# Patient Record
Sex: FEMALE | NOT HISPANIC OR LATINO | Employment: UNEMPLOYED | URBAN - METROPOLITAN AREA
[De-identification: names, ages, dates, MRNs, and addresses within clinical notes are randomized per-mention and may not be internally consistent; named-entity substitution may affect disease eponyms.]

---

## 2017-01-17 ENCOUNTER — OFFICE VISIT (OUTPATIENT)
Dept: OBSTETRICS AND GYNECOLOGY | Facility: CLINIC | Age: 37
End: 2017-01-17
Payer: COMMERCIAL

## 2017-01-17 VITALS
SYSTOLIC BLOOD PRESSURE: 100 MMHG | HEART RATE: 71 BPM | DIASTOLIC BLOOD PRESSURE: 58 MMHG | WEIGHT: 158.31 LBS | BODY MASS INDEX: 28.05 KG/M2 | HEIGHT: 63 IN

## 2017-01-17 DIAGNOSIS — Z30.09 ENCOUNTER FOR FEMALE FAMILY PLANNING COUNSELING: ICD-10-CM

## 2017-01-17 DIAGNOSIS — N92.6 IRREGULAR MENSES: Primary | ICD-10-CM

## 2017-01-17 PROCEDURE — 99202 OFFICE O/P NEW SF 15 MIN: CPT | Mod: S$GLB,,, | Performed by: NURSE PRACTITIONER

## 2017-01-17 PROCEDURE — 1159F MED LIST DOCD IN RCRD: CPT | Mod: S$GLB,,, | Performed by: NURSE PRACTITIONER

## 2017-01-17 PROCEDURE — 99999 PR PBB SHADOW E&M-NEW PATIENT-LVL III: CPT | Mod: PBBFAC,,, | Performed by: NURSE PRACTITIONER

## 2017-01-17 NOTE — PROGRESS NOTES
Chief Complaint   Patient presents with    Well Woman       History of Present Illness: Julisa Deleon is a 36 y.o. female that presents today 2017 for   Pt here for irregular menses. She brought log of cycles in to show the dates (listed below).      Pt reports that she feels like they have gotten closer together. She states that the cycles are about 5 days in length and are not heavy. She has been worrying about her cycles because she has been trying to get pregnant for the past 2-3 months, so here cycles and ovulation has been on her mind more. She has a 12 year old, but just moved here from [place] and wants to settle down and have a baby. No other complaints noted.       Chief Complaint   Patient presents with    Well Woman         Past Medical History   Diagnosis Date    Anemia        Past Surgical History   Procedure Laterality Date    Appendectomy       section         No current outpatient prescriptions on file.     No current facility-administered medications for this visit.        Review of patient's allergies indicates:  No Known Allergies    Family History   Problem Relation Age of Onset    Lung cancer Maternal Grandmother     Breast cancer Neg Hx     Cancer Neg Hx     Colon cancer Neg Hx     Ovarian cancer Neg Hx     Diabetes Neg Hx     Eclampsia Neg Hx     Hypertension Neg Hx     Miscarriages / Stillbirths Neg Hx      labor Neg Hx     Stroke Neg Hx        Social History   Substance Use Topics    Smoking status: Never Smoker    Smokeless tobacco: Never Used    Alcohol use No       OB History    Para Term  AB SAB TAB Ectopic Multiple Living   1 0 0 0 0 0 0 0 0 1      # Outcome Date GA Lbr Mariano/2nd Weight Sex Delivery Anes PTL Lv   1       CS-Unspec   Y          Review of Symptoms:  GENERAL: Denies weight gain or weight loss. Feeling well overall.  "  SKIN: Denies rash or lesions.   ABDOMEN: No abdominal pain, constipation, diarrhea, nausea, vomiting or rectal bleeding.   URINARY: No frequency, dysuria, hematuria, or burning on urination.      Visit Vitals    BP (!) 100/58    Pulse 71    Ht 5' 3" (1.6 m)    Wt 71.8 kg (158 lb 4.6 oz)    LMP 01/10/2017 (Exact Date)         ASSESSMENT/PLAN:  Irregular menses    Encounter for female family planning counseling      -Discussed cycles with pt and explained that her cycles range from 23-29 days. A typical cycle is 28 days in length, so her cycles are still coming monthly and just off by a few days. If she wanted it to have them regulated to her liking she could take birth control pills, but I understand that she wants to try and get pregnant. Pt states that she abdullahi snot wish to start birth control at this time.     -Discussed with pt that it can take up to a year to get pregnant and this is normal. Although, since she is over 35, I recommend that after 6 months of actively trying she should see a fertility specialist. Pt verbalized understanding.    -Also instructed since she is unsure of when her cycle will come, she can start using the ovulation predictor kits to ensure that she is timing intercourse appropriately. Pt verbalized understanding.     Total duration face to face visit time 25 minutes.   Total time spent counseling greater than fifty percent of total visit time.  Counseling included discussion of treatment options and risks and benefits.     Follow-up:  RTC as needed  "

## 2021-04-13 ENCOUNTER — PATIENT MESSAGE (OUTPATIENT)
Dept: ADMINISTRATIVE | Facility: CLINIC | Age: 41
End: 2021-04-13

## 2021-05-12 ENCOUNTER — PATIENT MESSAGE (OUTPATIENT)
Dept: RESEARCH | Facility: HOSPITAL | Age: 41
End: 2021-05-12